# Patient Record
Sex: MALE | Race: WHITE
[De-identification: names, ages, dates, MRNs, and addresses within clinical notes are randomized per-mention and may not be internally consistent; named-entity substitution may affect disease eponyms.]

---

## 2018-10-23 ENCOUNTER — HOSPITAL ENCOUNTER (OUTPATIENT)
Dept: HOSPITAL 92 - LABBT | Age: 63
Discharge: HOME | End: 2018-10-23
Attending: SURGERY
Payer: COMMERCIAL

## 2018-10-23 DIAGNOSIS — K64.8: ICD-10-CM

## 2018-10-23 DIAGNOSIS — Z01.818: Primary | ICD-10-CM

## 2018-10-23 LAB
ANION GAP SERPL CALC-SCNC: 10 MMOL/L (ref 10–20)
BASOPHILS # BLD AUTO: 0.1 THOU/UL (ref 0–0.2)
BASOPHILS NFR BLD AUTO: 0.9 % (ref 0–1)
BUN SERPL-MCNC: 14 MG/DL (ref 8.4–25.7)
CALCIUM SERPL-MCNC: 9.1 MG/DL (ref 7.8–10.44)
CHLORIDE SERPL-SCNC: 105 MMOL/L (ref 98–107)
CO2 SERPL-SCNC: 24 MMOL/L (ref 23–31)
CREAT CL PREDICTED SERPL C-G-VRATE: 0 ML/MIN (ref 70–130)
EOSINOPHIL # BLD AUTO: 0.2 THOU/UL (ref 0–0.7)
EOSINOPHIL NFR BLD AUTO: 3.1 % (ref 0–10)
GLUCOSE SERPL-MCNC: 89 MG/DL (ref 80–115)
HGB BLD-MCNC: 13.8 G/DL (ref 14–18)
LYMPHOCYTES # BLD: 1.6 THOU/UL (ref 1.2–3.4)
LYMPHOCYTES NFR BLD AUTO: 23.7 % (ref 21–51)
MCH RBC QN AUTO: 30.5 PG (ref 27–31)
MCV RBC AUTO: 92.8 FL (ref 78–98)
MONOCYTES # BLD AUTO: 0.7 THOU/UL (ref 0.11–0.59)
MONOCYTES NFR BLD AUTO: 10.7 % (ref 0–10)
NEUTROPHILS # BLD AUTO: 4.1 THOU/UL (ref 1.4–6.5)
NEUTROPHILS NFR BLD AUTO: 61.6 % (ref 42–75)
PLATELET # BLD AUTO: 239 THOU/UL (ref 130–400)
POTASSIUM SERPL-SCNC: 4.4 MMOL/L (ref 3.5–5.1)
RBC # BLD AUTO: 4.53 MILL/UL (ref 4.7–6.1)
SODIUM SERPL-SCNC: 135 MMOL/L (ref 136–145)
WBC # BLD AUTO: 6.6 THOU/UL (ref 4.8–10.8)

## 2018-10-23 PROCEDURE — 80048 BASIC METABOLIC PNL TOTAL CA: CPT

## 2018-10-23 PROCEDURE — 93005 ELECTROCARDIOGRAM TRACING: CPT

## 2018-10-23 PROCEDURE — 93010 ELECTROCARDIOGRAM REPORT: CPT

## 2018-10-23 PROCEDURE — 85025 COMPLETE CBC W/AUTO DIFF WBC: CPT

## 2018-10-25 ENCOUNTER — HOSPITAL ENCOUNTER (OUTPATIENT)
Dept: HOSPITAL 92 - SDC | Age: 63
Discharge: HOME | End: 2018-10-25
Attending: SURGERY
Payer: COMMERCIAL

## 2018-10-25 VITALS — BODY MASS INDEX: 35.1 KG/M2

## 2018-10-25 DIAGNOSIS — Z79.899: ICD-10-CM

## 2018-10-25 DIAGNOSIS — Z79.82: ICD-10-CM

## 2018-10-25 DIAGNOSIS — K64.8: Primary | ICD-10-CM

## 2018-10-25 DIAGNOSIS — I10: ICD-10-CM

## 2018-10-25 DIAGNOSIS — K64.4: ICD-10-CM

## 2018-10-25 PROCEDURE — 88304 TISSUE EXAM BY PATHOLOGIST: CPT

## 2018-10-25 PROCEDURE — 96374 THER/PROPH/DIAG INJ IV PUSH: CPT

## 2018-10-25 PROCEDURE — 065Y0ZC DESTRUCTION OF HEMORRHOIDAL PLEXUS, OPEN APPROACH: ICD-10-PCS | Performed by: SURGERY

## 2018-10-25 NOTE — OP
DATE OF PROCEDURE:  10/25/2018

 

PREOPERATIVE DIAGNOSIS:  Prolapsing internal hemorrhoids.

 

POSTOPERATIVE DIAGNOSIS:  Prolapsing internal hemorrhoids.

 

PROCEDURE PERFORMED:  PPH stapled hemorrhoidectomy.

 

SURGEON:  Jack Alvarado M.D.

 

ANESTHESIA:  General.

 

ESTIMATED BLOOD LOSS:  Minimal.

 

COMPLICATIONS:  None.

 

SPECIMEN:  Hemorrhoid.

 

TECHNIQUE:  The patient was taken to the operating room and placed supine on the table.  After genera
l anesthetic was obtained, he was placed in prone position.  His buttock _____ taped open.  His _____
 area is prepped and draped in a sterile fashion.  Rectal exam reveals prolapsing internal hemorrhoid
s, but no mass.  The sphincter protecting device internal obturator was placed.  The sphincter protec
ting device is sewn in place using enclosed silk.  The 2-0 Prolene was used to make a pursestring 2 c
m above the dentate line.  Stapler was opened to its fullest extent.  The anvil was placed above the 
pursestring.  The sutures for the pursestring are pulled through the stapler under tension held on th
e sutures.  The stapler is tightened down to the green zone.  It is fired.  There is a good ring of s
taple line and a good ring of hemorrhoid tissue.  A few bleeders on the staple line were oversewn usi
ng Vicryl suture.  There is no active bleeding.  Gelfoam and lidocaine jelly packed in the anal canal
.  The sphincter protector was removed.  The patient is en route to recovery in stable condition.  Al
l instrument counts, needle counts, lap counts were correct.

## 2019-09-19 ENCOUNTER — HOSPITAL ENCOUNTER (INPATIENT)
Dept: HOSPITAL 92 - SJJU | Age: 64
LOS: 14 days | Discharge: HOME | DRG: 470 | End: 2019-10-03
Attending: ORTHOPAEDIC SURGERY | Admitting: ORTHOPAEDIC SURGERY
Payer: COMMERCIAL

## 2019-09-19 ENCOUNTER — HOSPITAL ENCOUNTER (OUTPATIENT)
Dept: HOSPITAL 92 - LABBT | Age: 64
Discharge: HOME | End: 2019-09-19
Attending: ORTHOPAEDIC SURGERY
Payer: COMMERCIAL

## 2019-09-19 VITALS — BODY MASS INDEX: 34 KG/M2

## 2019-09-19 DIAGNOSIS — I10: ICD-10-CM

## 2019-09-19 DIAGNOSIS — K59.00: ICD-10-CM

## 2019-09-19 DIAGNOSIS — G47.30: ICD-10-CM

## 2019-09-19 DIAGNOSIS — I25.10: ICD-10-CM

## 2019-09-19 DIAGNOSIS — Z79.51: ICD-10-CM

## 2019-09-19 DIAGNOSIS — K21.9: ICD-10-CM

## 2019-09-19 DIAGNOSIS — M17.11: ICD-10-CM

## 2019-09-19 DIAGNOSIS — Z79.01: ICD-10-CM

## 2019-09-19 DIAGNOSIS — M17.11: Primary | ICD-10-CM

## 2019-09-19 DIAGNOSIS — M10.9: ICD-10-CM

## 2019-09-19 DIAGNOSIS — E78.5: ICD-10-CM

## 2019-09-19 DIAGNOSIS — E78.00: ICD-10-CM

## 2019-09-19 DIAGNOSIS — Z01.818: Primary | ICD-10-CM

## 2019-09-19 DIAGNOSIS — Z95.5: ICD-10-CM

## 2019-09-19 DIAGNOSIS — Z79.899: ICD-10-CM

## 2019-09-19 LAB
ANION GAP SERPL CALC-SCNC: 16 MMOL/L (ref 10–20)
BASOPHILS # BLD AUTO: 0.1 THOU/UL (ref 0–0.2)
BASOPHILS NFR BLD AUTO: 1 % (ref 0–1)
BUN SERPL-MCNC: 17 MG/DL (ref 8.4–25.7)
CALCIUM SERPL-MCNC: 9.5 MG/DL (ref 7.8–10.44)
CHLORIDE SERPL-SCNC: 104 MMOL/L (ref 98–107)
CO2 SERPL-SCNC: 23 MMOL/L (ref 23–31)
CREAT CL PREDICTED SERPL C-G-VRATE: 0 ML/MIN (ref 70–130)
EOSINOPHIL # BLD AUTO: 0.3 THOU/UL (ref 0–0.7)
EOSINOPHIL NFR BLD AUTO: 3.3 % (ref 0–10)
GLUCOSE SERPL-MCNC: 94 MG/DL (ref 80–115)
HGB BLD-MCNC: 14.5 G/DL (ref 14–18)
INR PPP: 0.9
LYMPHOCYTES # BLD: 2.7 THOU/UL (ref 1.2–3.4)
LYMPHOCYTES NFR BLD AUTO: 30 % (ref 21–51)
MCH RBC QN AUTO: 30.5 PG (ref 27–31)
MCV RBC AUTO: 91.4 FL (ref 78–98)
MONOCYTES # BLD AUTO: 0.8 THOU/UL (ref 0.11–0.59)
MONOCYTES NFR BLD AUTO: 8.9 % (ref 0–10)
MUCOUS THREADS UR QL AUTO: (no result) LPF
NEUTROPHILS # BLD AUTO: 5.2 THOU/UL (ref 1.4–6.5)
NEUTROPHILS NFR BLD AUTO: 56.8 % (ref 42–75)
PLATELET # BLD AUTO: 228 THOU/UL (ref 130–400)
POTASSIUM SERPL-SCNC: 4.4 MMOL/L (ref 3.5–5.1)
PROT UR STRIP.AUTO-MCNC: 10 MG/DL
PROTHROMBIN TIME: 12.6 SEC (ref 12–14.7)
RBC # BLD AUTO: 4.75 MILL/UL (ref 4.7–6.1)
RBC UR QL AUTO: (no result) HPF (ref 0–3)
SODIUM SERPL-SCNC: 139 MMOL/L (ref 136–145)
WBC # BLD AUTO: 9.1 THOU/UL (ref 4.8–10.8)
WBC UR QL AUTO: (no result) HPF (ref 0–3)

## 2019-09-19 PROCEDURE — 85025 COMPLETE CBC W/AUTO DIFF WBC: CPT

## 2019-09-19 PROCEDURE — 86901 BLOOD TYPING SEROLOGIC RH(D): CPT

## 2019-09-19 PROCEDURE — 80048 BASIC METABOLIC PNL TOTAL CA: CPT

## 2019-09-19 PROCEDURE — C1713 ANCHOR/SCREW BN/BN,TIS/BN: HCPCS

## 2019-09-19 PROCEDURE — 86900 BLOOD TYPING SEROLOGIC ABO: CPT

## 2019-09-19 PROCEDURE — 93010 ELECTROCARDIOGRAM REPORT: CPT

## 2019-09-19 PROCEDURE — 36415 COLL VENOUS BLD VENIPUNCTURE: CPT

## 2019-09-19 PROCEDURE — 85027 COMPLETE CBC AUTOMATED: CPT

## 2019-09-19 PROCEDURE — 86850 RBC ANTIBODY SCREEN: CPT

## 2019-09-19 PROCEDURE — 81001 URINALYSIS AUTO W/SCOPE: CPT

## 2019-09-19 PROCEDURE — 85610 PROTHROMBIN TIME: CPT

## 2019-09-19 PROCEDURE — S0020 INJECTION, BUPIVICAINE HYDRO: HCPCS

## 2019-09-19 PROCEDURE — C1776 JOINT DEVICE (IMPLANTABLE): HCPCS

## 2019-09-19 PROCEDURE — 93005 ELECTROCARDIOGRAM TRACING: CPT

## 2019-09-19 PROCEDURE — 87081 CULTURE SCREEN ONLY: CPT

## 2019-09-30 LAB
INR PPP: 1
PROTHROMBIN TIME: 13.2 SEC (ref 12–14.7)

## 2019-09-30 PROCEDURE — 0SRC0J9 REPLACEMENT OF RIGHT KNEE JOINT WITH SYNTHETIC SUBSTITUTE, CEMENTED, OPEN APPROACH: ICD-10-PCS | Performed by: ORTHOPAEDIC SURGERY

## 2019-09-30 RX ADMIN — CEFAZOLIN SODIUM SCH MLS: 2 SOLUTION INTRAVENOUS at 14:39

## 2019-09-30 RX ADMIN — HYDROCODONE BITARTRATE AND ACETAMINOPHEN PRN TAB: 10; 325 TABLET ORAL at 14:37

## 2019-09-30 RX ADMIN — CEFAZOLIN SODIUM SCH MLS: 2 SOLUTION INTRAVENOUS at 23:16

## 2019-09-30 RX ADMIN — AZELASTINE HYDROCHLORIDE SCH SPR: 137 SPRAY, METERED NASAL at 21:12

## 2019-09-30 RX ADMIN — ASPIRIN SCH MG: 81 TABLET ORAL at 20:33

## 2019-09-30 RX ADMIN — DOCUSATE SODIUM 50 MG AND SENNOSIDES 8.6 MG SCH TAB: 8.6; 5 TABLET, FILM COATED ORAL at 20:34

## 2019-09-30 RX ADMIN — HYDROCODONE BITARTRATE AND ACETAMINOPHEN PRN TAB: 10; 325 TABLET ORAL at 21:17

## 2019-09-30 NOTE — OP
DATE OF PROCEDURE:  09/30/2019



ASSISTANT:  Brent Osei PA-C



PREOPERATIVE DIAGNOSES:  Right knee osteoarthrosis.



POSTOPERATIVE DIAGNOSES:  Right knee osteoarthrosis.



PROCEDURE PERFORMED:  Right total knee replacement using [place] pinless 
navigation.



ESTIMATED BLOOD LOSS:  150 mL.



TOURNIQUET TIME:  NONE.



COMPLICATIONS:  None.



IMPLANTS:  [place] [employer] total knee system, we used a size 6 cruciate-
retaining

femur.  We used a size 6 primary tibial baseplate.  We used a 6 x 9 mm CS X3 
tibial

bearing and an asymmetric 35 x 10 X3 patella. 



DISPOSITION:  He did go to recovery room in stable condition.



INDICATIONS:  This is a 64-year-old male, who comes in complaining of 
significant

right knee pain.  He has failed nonoperative treatment.  The patient has a 
previous

history of dysvascular leg and has had multiple stents in the right leg. 



PROCEDURE IN DETAIL:  After all appropriate consent forms were explained and 
signed,

the patient was taken back to the operating room and at this time was given 
general

anesthetic.  Once the level of anesthesia was appropriate, the right leg was 
then prepped and draped in standard surgical fashion.   Midline incision was 
made with a 10 blade down through the skin and subcutaneous tissue. 

Bovie electrocautery was used to coagulate any brisk venous bleeding.  A new 
blade was used to make a medial

parapatellar arthrotomy.  Small subperiosteal release was performed medially and

excess fat pad was removed.  The knee was flexed up to gain access to the femur.

The femur was navigated and distal femoral resection was made.  Epicondylar 
access

was used to align our sizing jig and this was pinned in place.  We sized our 
femur

to be a size 6 cruciate-retaining femur.  4:1 cutting block was applied and 
pinned.

Anterior and posterior chamfer cuts were then made.  We navigated out our 
proximal

tibia and made our proximal tibial resection.  Spreaders were used to remove any

posterior osteophytes off the back of the femur as well as remaining meniscal

tissue.  A long alignment mary jane was then used to achieve correct rotation of our

tibial baseplate and we used a size 6 primary tibial baseplate was chosen.  
This was

pinned in place.  We trialed the polyethylene and we used a 6 x 9 mm CS X3 
tibial

bearing polyethylene gave us full extension and good stability throughout range 
of

motion.  Two towel clips and a saw were used to cut our patella.  Three lug nuts

were drilled and an asymmetric 35 x 10 X3 patella was trialed which sat nicely 
in

the trochlear groove.  We then drilled our femur and punched our tibia.  All

components were removed.  The knee was thoroughly irrigated and dried.  Cement 
was

mixed into the cement gun on the back table.  Components were then placed.  The 
knee

was held out in full extension until the cement had dried.  All excess bone 
cement

was removed.  Multiple #2 Vicryl stitches as well as a Quill were used to close 
our

extensor mechanism.  0 Quill followed by a running Monoderm was then used to 
close

the skin.  Surgicel glue was then used on the skin.  Once this had dried, soft

tissue dressing was applied to the limb,  The patient was then awakened and 
taken to the recovery room in

stable condition.  All counts were correct at the end of the case.  The patient 
did

receive preoperative IV antibiotics.  The patient was injected with Marcaine for

postoperative pain relief. 







Job ID:  [id number]



[facility]

## 2019-10-01 LAB
HGB BLD-MCNC: 11.1 G/DL (ref 14–18)
MCH RBC QN AUTO: 31.7 PG (ref 27–31)
MCV RBC AUTO: 92 FL (ref 78–98)
PLATELET # BLD AUTO: 172 THOU/UL (ref 130–400)
RBC # BLD AUTO: 3.51 MILL/UL (ref 4.7–6.1)
WBC # BLD AUTO: 8.7 THOU/UL (ref 4.8–10.8)

## 2019-10-01 RX ADMIN — DOCUSATE SODIUM 50 MG AND SENNOSIDES 8.6 MG SCH TAB: 8.6; 5 TABLET, FILM COATED ORAL at 08:00

## 2019-10-01 RX ADMIN — ASPIRIN SCH MG: 81 TABLET ORAL at 08:01

## 2019-10-01 RX ADMIN — ASPIRIN SCH MG: 81 TABLET ORAL at 21:00

## 2019-10-01 RX ADMIN — MULTIPLE VITAMINS W/ MINERALS TAB SCH TAB: TAB at 08:00

## 2019-10-01 RX ADMIN — AZELASTINE HYDROCHLORIDE SCH SPR: 137 SPRAY, METERED NASAL at 08:10

## 2019-10-01 RX ADMIN — HYDROCODONE BITARTRATE AND ACETAMINOPHEN PRN TAB: 10; 325 TABLET ORAL at 06:36

## 2019-10-01 RX ADMIN — AZELASTINE HYDROCHLORIDE SCH SPR: 137 SPRAY, METERED NASAL at 21:06

## 2019-10-01 RX ADMIN — HYDROCODONE BITARTRATE AND ACETAMINOPHEN PRN TAB: 10; 325 TABLET ORAL at 02:10

## 2019-10-01 RX ADMIN — HYDROCODONE BITARTRATE AND ACETAMINOPHEN PRN TAB: 10; 325 TABLET ORAL at 14:40

## 2019-10-01 RX ADMIN — HYDROCODONE BITARTRATE AND ACETAMINOPHEN PRN TAB: 10; 325 TABLET ORAL at 10:23

## 2019-10-01 RX ADMIN — HYDROCODONE BITARTRATE AND ACETAMINOPHEN PRN TAB: 10; 325 TABLET ORAL at 18:33

## 2019-10-01 RX ADMIN — DOCUSATE SODIUM 50 MG AND SENNOSIDES 8.6 MG SCH TAB: 8.6; 5 TABLET, FILM COATED ORAL at 21:01

## 2019-10-01 RX ADMIN — HYDROCODONE BITARTRATE AND ACETAMINOPHEN PRN TAB: 10; 325 TABLET ORAL at 22:31

## 2019-10-01 NOTE — PDOC.HOSPP
- Subjective


Encounter Date: 10/01/19


Encounter Time: 15:00


Subjective: 





pt up in chair no complains





- Objective


Vital Signs & Weight: 


 Vital Signs (12 hours)











  Temp Pulse Resp BP BP Pulse Ox


 


 10/01/19 15:20  98.5 F  69  18   152/73 H  97


 


 10/01/19 08:01   72   134/71  


 


 10/01/19 07:53  98.2 F  72  18   134/71  98








 Weight











Admit Weight                   258 lb


 


Weight                         258 lb














I&O: 


 











 09/30/19 10/01/19 10/02/19





 06:59 06:59 06:59


 


Intake Total  1550 


 


Balance  1550 











Result Diagrams: 


 10/01/19 03:53








Hospitalist ROS





- Review of Systems


Respiratory: denies: cough, dry, shortness of breath, hemoptysis, SOB with 

excertion, pleuritic pain, sputum, wheezing, other


Cardiovascular: denies: chest pain, palpitations, orthopnea, paroxysmal noc. 

dyspnea, edema, light headedness, other


Gastrointestinal: denies: nausea, vomiting, abdominal pain, diarrhea, 

constipation, melena, hematochezia, other





- Medication


Medications: 


Active Medications











Generic Name Dose Route Start Last Admin





  Trade Name Freq  PRN Reason Stop Dose Admin


 


Hydrocodone Bitart/Acetaminophen  2 tab  09/30/19 08:22  10/01/19 18:33





  Norco 10/325  PO   2 tab





  Q4H PRN   Administration





  PAIN (4-6)   





     





     





     


 


Allopurinol  300 mg  10/01/19 09:00  10/01/19 08:00





  Zyloprim  PO   300 mg





  DAILY DEEPIKA   Administration





     





     





     





     


 


Amlodipine Besylate  2.5 mg  10/01/19 09:00  10/01/19 08:01





  Norvasc  PO   2.5 mg





  DAILY DEEPIKA   Administration





     





     





     





     


 


Aspirin  81 mg  09/30/19 21:00  10/01/19 08:01





  Ecotrin  PO   81 mg





  BID DEEPIKA   Administration





     





     





     





     


 


Atorvastatin Calcium  80 mg  09/30/19 21:00  09/30/19 20:33





  Lipitor  PO   80 mg





  QPM DEEPIKA   Administration





     





     





     





     


 


Azelastine HCl  0 ml  09/30/19 21:00  10/01/19 08:10





  Azelastine  FS   2 spr





  BID DEEPIKA   Administration





     





     





     





     


 


Cholecalciferol  1,000 units  10/01/19 09:00  10/01/19 08:02





  Vitamin D3  PO   1,000 units





  DAILY DEEPIKA   Administration





     





     





     





     


 


Clopidogrel Bisulfate  75 mg  10/01/19 09:00  10/01/19 08:01





  Plavix  PO   75 mg





  DAILY DEEPIKA   Administration





     





     





     





     


 


Ezetimibe  10 mg  09/30/19 21:00  09/30/19 20:33





  Zetia  PO   10 mg





  HS DEEPIKA   Administration





     





     





     





     


 


Fentanyl  50 mcg  09/30/19 08:23  10/01/19 09:18





  Sublimaze  SLOW IVP   50 mcg





  Q1H PRN   Administration





  Breakthrough Pain   





     





     





     


 


Ferrous Gluconate  324 mg  09/30/19 21:00  10/01/19 08:00





  Fergon  PO   324 mg





  BID DEEPIKA   Administration





     





     





     





     


 


Fish Oil  1,000 mg  10/01/19 09:00  10/01/19 08:00





  Fish Oil  PO   1,000 mg





  DAILY DEEPIKA   Administration





     





     





     





     


 


Fluticasone Propionate  0 gm  10/01/19 09:00  10/01/19 10:24





  Flonase Nasal [place]  NASAL   2 spray





  DAILY DEEPIKA   Administration





     





     





     





     


 


Ropivacaine 250 ml/ Device  250 mls @ 10 mls/hr  09/30/19 08:22  10/01/19 12:36





  NERVE BLCK   250 mls





  INF DEEPIKA   Administration





     





     





     





  As Directed   


 


Sodium Chloride  1,000 mls @ 100 mls/hr  09/30/19 10:00  10/01/19 17:03





  Normal Saline 0.9%  IV   Not Given





  .Q10H DEEPIKA   





     





     





     





     


 


Iron/Minerals/Multivitamins  1 tab  10/01/19 09:00  10/01/19 08:00





  Theragran M  PO   1 tab





  DAILY DEEPIKA   Administration





     





     





     





     


 


Ketorolac Tromethamine  30 mg  09/30/19 12:00  10/01/19 17:39





  Toradol  IVP  10/02/19 06:01  30 mg





  Q6HR DEEPIKA   Administration





     





     





     





     


 


Loratadine  10 mg  09/30/19 21:00  09/30/19 20:34





  Claritin  PO   10 mg





  QPM DEEPIKA   Administration





     





     





     





     


 


Losartan Potassium  100 mg  10/01/19 09:00  10/01/19 08:02





  Cozaar  PO   100 mg





  DAILY DEEPIKA   Administration





     





     





     





     


 


Metoprolol Tartrate  50 mg  10/01/19 09:00  10/01/19 08:00





  Lopressor  PO   50 mg





  DAILY DEEPIKA   Administration





     





     





     





     


 


Pantoprazole Sodium  40 mg  10/01/19 09:00  10/01/19 08:00





  Protonix  PO   40 mg





  DAILY DEEPIKA   Administration





     





     





     





     


 


Polyethylene Glycol  17 gm  10/01/19 09:00  10/01/19 07:59





  Miralax  PO   17 gm





  DAILY DEEPIKA   Administration





     





     





     





     


 


Senna/Docusate Sodium  2 tab  09/30/19 21:00  10/01/19 08:00





  Senokot S  PO   2 tab





  BID DEEPIKA   Administration





     





     





     





     


 


Sodium Chloride  10 ml  09/30/19 09:46  10/01/19 08:00





  Flush - Normal Saline  IVF   10 ml





  PRN PRN   Administration





  Saline Flush   





     





     





     


 


Tramadol HCl  100 mg  09/30/19 08:22  10/01/19 12:42





  Ultram  PO   100 mg





  Q6H PRN   Administration





  Moderate Pain 4-6   





     





     





     


 


Vitamin E  1,000 units  10/01/19 09:00  10/01/19 10:24





  Vitamin E  PO   1,000 units





  DAILY DEEPIKA   Administration





     





     





     





     














- Exam


Heart: negative: RRR, no murmur, no gallops, no rubs, normal peripheral pulses, 

irregular, diminshed peripheral pulses, murmur present, II/IV, III/IV


Respiratory: negative: CTAB, no wheezes, no rales, no ronchi, normal chest 

expansion, no tachypnea, normal percussion, rales, rhonchi, tachypneic, wheezes


Gastrointestinal: negative: soft, non-tender, non-distended, normal bowel sounds

, no palpable masses, no hepatomegaly, no splenomegaly, no bruit, no guarding, 

no rigidity, tender to palpation, distended, diminished bowl sounds, voluntary 

guarding





Hosp A/P


(1) CAD (coronary artery disease)


Code(s): I25.10 - ATHSCL HEART DISEASE OF NATIVE CORONARY ARTERY W/O ANG PCTRS 

  Status: Acute   





(2) HTN (hypertension)


Code(s): I10 - ESSENTIAL (PRIMARY) HYPERTENSION   Status: Acute   





(3) Hyperlipemia


Code(s): E78.5 - HYPERLIPIDEMIA, UNSPECIFIED   Status: Acute   





- Plan





pt up in chair no complains, will continue his bp meds, pt has CAD s/o 10 

stents. will continue asa/plavix/statin.

## 2019-10-01 NOTE — PRG
DATE OF SERVICE:  10/01/2019



SUBJECTIVE:  José Manuel is a 64-year-old male who is postop day 1 from a right total

knee arthroplasty.  He is doing relatively well.  His pain is very well controlled

with block and his oral medications.  He has no complaints currently.  José Manuel

walked 300 feet this morning. 



OBJECTIVE:  VITAL SIGNS:  Temperature 98.2, pulse 72, respiratory rate 18, blood

pressure is 134/71. 

GENERAL:  He is alert and oriented to person, place, time, and situation.

Responsive to appropriate with examiner.  Grossly nonfocal. 

EXTREMITIES:  His incision is clean.  There is no strike through.  No erythema. 

NEUROLOGIC:  He is neurovascularly intact.



LABORATORY DATA:  Hemoglobin and hematocrit 11.1 and 32.2.



IMPRESSION:  A 64-year-old male postop day 1, right total knee arthroplasty doing

exceptionally well. 



PLAN:  Continue current care.  Probable discharge home tomorrow.







Job ID:  [id number]

## 2019-10-02 LAB
HGB BLD-MCNC: 10.5 G/DL (ref 14–18)
MCH RBC QN AUTO: 31.1 PG (ref 27–31)
MCV RBC AUTO: 92.6 FL (ref 78–98)
PLATELET # BLD AUTO: 171 THOU/UL (ref 130–400)
RBC # BLD AUTO: 3.38 MILL/UL (ref 4.7–6.1)
WBC # BLD AUTO: 8.7 THOU/UL (ref 4.8–10.8)

## 2019-10-02 RX ADMIN — HYDROCODONE BITARTRATE AND ACETAMINOPHEN PRN TAB: 10; 325 TABLET ORAL at 12:42

## 2019-10-02 RX ADMIN — DOCUSATE SODIUM 50 MG AND SENNOSIDES 8.6 MG SCH: 8.6; 5 TABLET, FILM COATED ORAL at 09:15

## 2019-10-02 RX ADMIN — AZELASTINE HYDROCHLORIDE SCH SPR: 137 SPRAY, METERED NASAL at 21:12

## 2019-10-02 RX ADMIN — ASPIRIN SCH MG: 81 TABLET ORAL at 21:03

## 2019-10-02 RX ADMIN — DOCUSATE SODIUM 50 MG AND SENNOSIDES 8.6 MG SCH TAB: 8.6; 5 TABLET, FILM COATED ORAL at 21:03

## 2019-10-02 RX ADMIN — AZELASTINE HYDROCHLORIDE SCH SPR: 137 SPRAY, METERED NASAL at 09:14

## 2019-10-02 RX ADMIN — HYDROCODONE BITARTRATE AND ACETAMINOPHEN PRN TAB: 10; 325 TABLET ORAL at 06:23

## 2019-10-02 RX ADMIN — MULTIPLE VITAMINS W/ MINERALS TAB SCH TAB: TAB at 09:12

## 2019-10-02 RX ADMIN — ASPIRIN SCH MG: 81 TABLET ORAL at 09:12

## 2019-10-03 VITALS — TEMPERATURE: 98.2 F | SYSTOLIC BLOOD PRESSURE: 148 MMHG | DIASTOLIC BLOOD PRESSURE: 80 MMHG

## 2019-10-03 LAB
HGB BLD-MCNC: 10.7 G/DL (ref 14–18)
MCH RBC QN AUTO: 31.5 PG (ref 27–31)
MCV RBC AUTO: 89.6 FL (ref 78–98)
PLATELET # BLD AUTO: 178 THOU/UL (ref 130–400)
RBC # BLD AUTO: 3.39 MILL/UL (ref 4.7–6.1)
WBC # BLD AUTO: 8.2 THOU/UL (ref 4.8–10.8)

## 2019-10-03 RX ADMIN — HYDROCODONE BITARTRATE AND ACETAMINOPHEN PRN TAB: 10; 325 TABLET ORAL at 03:01

## 2019-10-03 RX ADMIN — ASPIRIN SCH MG: 81 TABLET ORAL at 08:42

## 2019-10-03 RX ADMIN — HYDROCODONE BITARTRATE AND ACETAMINOPHEN PRN TAB: 10; 325 TABLET ORAL at 08:46

## 2019-10-03 RX ADMIN — AZELASTINE HYDROCHLORIDE SCH SPR: 137 SPRAY, METERED NASAL at 08:51

## 2019-10-03 RX ADMIN — DOCUSATE SODIUM 50 MG AND SENNOSIDES 8.6 MG SCH TAB: 8.6; 5 TABLET, FILM COATED ORAL at 08:46

## 2019-10-03 RX ADMIN — MULTIPLE VITAMINS W/ MINERALS TAB SCH TAB: TAB at 08:44

## 2019-11-22 ENCOUNTER — HOSPITAL ENCOUNTER (OUTPATIENT)
Dept: HOSPITAL 92 - LABBT | Age: 64
Discharge: HOME | End: 2019-11-22
Attending: ORTHOPAEDIC SURGERY
Payer: COMMERCIAL

## 2019-11-22 DIAGNOSIS — S76.111A: ICD-10-CM

## 2019-11-22 DIAGNOSIS — Z01.818: Primary | ICD-10-CM

## 2019-11-22 LAB
ANION GAP SERPL CALC-SCNC: 8 MMOL/L (ref 10–20)
BASOPHILS # BLD AUTO: 0.1 THOU/UL (ref 0–0.2)
BASOPHILS NFR BLD AUTO: 1 % (ref 0–1)
BUN SERPL-MCNC: 14 MG/DL (ref 8.4–25.7)
CALCIUM SERPL-MCNC: 9.3 MG/DL (ref 7.8–10.44)
CHLORIDE SERPL-SCNC: 103 MMOL/L (ref 98–107)
CO2 SERPL-SCNC: 30 MMOL/L (ref 23–31)
CREAT CL PREDICTED SERPL C-G-VRATE: 0 ML/MIN (ref 70–130)
EOSINOPHIL # BLD AUTO: 0.3 THOU/UL (ref 0–0.7)
EOSINOPHIL NFR BLD AUTO: 3.7 % (ref 0–10)
GLUCOSE SERPL-MCNC: 90 MG/DL (ref 80–115)
HGB BLD-MCNC: 13 G/DL (ref 14–18)
INR PPP: 1
LYMPHOCYTES # BLD: 2.3 THOU/UL (ref 1.2–3.4)
LYMPHOCYTES NFR BLD AUTO: 26.8 % (ref 21–51)
MCH RBC QN AUTO: 30.2 PG (ref 27–31)
MCV RBC AUTO: 93.7 FL (ref 78–98)
MONOCYTES # BLD AUTO: 0.7 THOU/UL (ref 0.11–0.59)
MONOCYTES NFR BLD AUTO: 8.3 % (ref 0–10)
NEUTROPHILS # BLD AUTO: 5.2 THOU/UL (ref 1.4–6.5)
NEUTROPHILS NFR BLD AUTO: 60.2 % (ref 42–75)
PLATELET # BLD AUTO: 254 THOU/UL (ref 130–400)
POTASSIUM SERPL-SCNC: 4.9 MMOL/L (ref 3.5–5.1)
PROTHROMBIN TIME: 12.8 SEC (ref 12–14.7)
RBC # BLD AUTO: 4.31 MILL/UL (ref 4.7–6.1)
SODIUM SERPL-SCNC: 136 MMOL/L (ref 136–145)
WBC # BLD AUTO: 8.7 THOU/UL (ref 4.8–10.8)

## 2019-11-22 PROCEDURE — 80048 BASIC METABOLIC PNL TOTAL CA: CPT

## 2019-11-22 PROCEDURE — 85610 PROTHROMBIN TIME: CPT

## 2019-11-22 PROCEDURE — 85025 COMPLETE CBC W/AUTO DIFF WBC: CPT

## 2019-11-22 PROCEDURE — 93010 ELECTROCARDIOGRAM REPORT: CPT

## 2019-11-22 PROCEDURE — 93005 ELECTROCARDIOGRAM TRACING: CPT

## 2019-11-24 NOTE — EKG
Test Reason : 

Blood Pressure : ***/*** mmHG

Vent. Rate : 062 BPM     Atrial Rate : 062 BPM

   P-R Int : 238 ms          QRS Dur : 082 ms

    QT Int : 404 ms       P-R-T Axes : 015 021 015 degrees

   QTc Int : 410 ms

 

Sinus rhythm with 1st degree A-V block

Inferior infarct , age undetermined

Abnormal ECG

When compared with ECG of 19-SEP-2019 12:23,

Inferior infarct is now Present

Confirmed by SONG CARPENTER (2) on 11/24/2019 2:30:15 PM

 

Referred By:  RENA           Confirmed By:SONG CARPENTER

## 2019-11-25 ENCOUNTER — HOSPITAL ENCOUNTER (OUTPATIENT)
Dept: HOSPITAL 92 - SDC | Age: 64
Discharge: HOME | End: 2019-11-25
Attending: ORTHOPAEDIC SURGERY
Payer: COMMERCIAL

## 2019-11-25 VITALS — BODY MASS INDEX: 34.5 KG/M2

## 2019-11-25 DIAGNOSIS — Z96.651: ICD-10-CM

## 2019-11-25 DIAGNOSIS — S76.111A: Primary | ICD-10-CM

## 2019-11-25 DIAGNOSIS — Z95.5: ICD-10-CM

## 2019-11-25 DIAGNOSIS — I10: ICD-10-CM

## 2019-11-25 DIAGNOSIS — Z79.899: ICD-10-CM

## 2019-11-25 DIAGNOSIS — F10.21: ICD-10-CM

## 2019-11-25 LAB
INR PPP: 0.9
PROTHROMBIN TIME: 12.2 SEC (ref 12–14.7)

## 2019-11-25 PROCEDURE — 0LQL0ZZ REPAIR RIGHT UPPER LEG TENDON, OPEN APPROACH: ICD-10-PCS | Performed by: ORTHOPAEDIC SURGERY

## 2019-11-25 PROCEDURE — 85610 PROTHROMBIN TIME: CPT

## 2019-11-25 PROCEDURE — L1830 KO IMMOB CANVAS LONG PRE OTS: HCPCS

## 2019-11-25 PROCEDURE — S0020 INJECTION, BUPIVICAINE HYDRO: HCPCS

## 2019-11-25 NOTE — OP
DATE OF PROCEDURE:  11/25/2019



PREOPERATIVE DIAGNOSIS:  Quadriceps tendon tear, status post right total knee

replacement. 



POSTOPERATIVE DIAGNOSIS:  Quadriceps tendon tear, status post right total

knee replacement. 



PROCEDURE PERFORMED:  Open repair of right quad tendon rupture.



ASSISTANT:  Brent Osei PA-C



ESTIMATED BLOOD LOSS:  20 to 30 mL.



COMPLICATIONS:  None.



ANESTHESIA:  He had a general anesthetic.  We did give him some local for postop

pain relief. 



IMPLANTS:  The only implants were some sutures.



INDICATIONS:  A 64-year-old male, who had a knee replacement done around 2 
months

ago and at this time, he was found last week to have an area of his quad tendon

repair that had opened up and he was having difficulty straightening his leg and

also noted a bulge underneath the skin.  At this time, he is taken for repair of

this. 



DESCRIPTION OF PROCEDURE:  After all appropriate consent forms were explained 
and

signed, he was taken back to the operative room and at this time was given a 
general

anesthetic.  Once the level of anesthesia was appropriate, the right lower 
extremity

was prepped and draped in standard surgical fashion.  No tourniquet was used

throughout this procedure.  At this time, a 10 blade was used to incise through 
his

old incision down through skin.  Bovie was used to coagulate any brisk venous

bleeding.  Joint fluid was evacuated at this time since we got underneath the 
skin.

We then made our way down to the quad tendon repair area and there was a small 
area

approximately 1.5 to 2 cm in length, which had opened up.  Everything else 
appeared

intact.  There was some granulation tissue in the area and this was removed 
with a

curette and a rongeur.  We then thoroughly irrigated with a liter of solution.  
At

this time, multiple #2 Vicryl were used to close repair primarily, followed by 
a #2

Stratafix.  We then used a 2-0 Vicryl and nylon sutures to close the remaining 
area.

 Prior to closing the skin, we did infiltrate medial and lateral skin flaps with

plain Marcaine for postop pain relief.  A bulky sterile dressing was then 
applied as

well as a knee immobilizer.  The patient was then awakened.  He was taken to the

recovery room in stable condition.  All counts were correct at the end of the 
case

and he did receive preoperative IV antibiotics. 







Job ID:  [id number]



[facility]

## 2020-01-27 ENCOUNTER — HOSPITAL ENCOUNTER (OUTPATIENT)
Dept: HOSPITAL 92 - SCSRAD | Age: 65
Discharge: HOME | End: 2020-01-27
Attending: FAMILY MEDICINE
Payer: COMMERCIAL

## 2020-01-27 DIAGNOSIS — S99.921A: Primary | ICD-10-CM

## 2020-01-27 NOTE — RAD
EXAM: 3 views of the right foot



HISTORY: Foot pain



COMPARISON: None



FINDINGS: 3 views of the right foot shows no evidence of acute fracture or dislocation. No soft tissu
e swelling is seen. No degenerative changes are present.



IMPRESSION: No evidence of acute osseous abnormality.



Reported By: Guy Esparza 

Electronically Signed:  1/27/2020 9:30 AM

## 2021-10-18 ENCOUNTER — HOSPITAL ENCOUNTER (OUTPATIENT)
Dept: HOSPITAL 92 - TBSIIMAG | Age: 66
Discharge: HOME | End: 2021-10-18
Attending: ORTHOPAEDIC SURGERY
Payer: MEDICARE

## 2021-10-18 DIAGNOSIS — M76.9: ICD-10-CM

## 2021-10-18 DIAGNOSIS — S73.101A: ICD-10-CM

## 2021-10-18 DIAGNOSIS — M25.551: Primary | ICD-10-CM

## 2022-05-24 ENCOUNTER — HOSPITAL ENCOUNTER (OUTPATIENT)
Dept: HOSPITAL 92 - CSHLAB | Age: 67
Discharge: HOME | End: 2022-05-24
Attending: SURGERY
Payer: MEDICARE

## 2022-05-24 DIAGNOSIS — Z20.822: ICD-10-CM

## 2022-05-24 DIAGNOSIS — Z01.818: Primary | ICD-10-CM

## 2022-05-24 LAB
ANION GAP SERPL CALC-SCNC: 15 MMOL/L (ref 10–20)
BUN SERPL-MCNC: 11 MG/DL (ref 8.4–25.7)
CALCIUM SERPL-MCNC: 9.4 MG/DL (ref 7.8–10.44)
CHLORIDE SERPL-SCNC: 102 MMOL/L (ref 98–107)
CO2 SERPL-SCNC: 23 MMOL/L (ref 23–31)
CREAT CL PREDICTED SERPL C-G-VRATE: 0 ML/MIN (ref 70–130)
GLUCOSE SERPL-MCNC: 112 MG/DL (ref 80–115)
HGB BLD-MCNC: 14.1 G/DL (ref 13.5–17.5)
MCH RBC QN AUTO: 31.1 PG (ref 27–33)
MCV RBC AUTO: 92.7 FL (ref 81.2–95.1)
PLATELET # BLD AUTO: 213 10X3/UL (ref 150–450)
POTASSIUM SERPL-SCNC: 4.4 MMOL/L (ref 3.5–5.1)
RBC # BLD AUTO: 4.53 10X6/UL (ref 4.32–5.72)
SODIUM SERPL-SCNC: 136 MMOL/L (ref 136–145)
WBC # BLD AUTO: 8.1 10X3/UL (ref 3.5–10.5)

## 2022-05-24 PROCEDURE — 80048 BASIC METABOLIC PNL TOTAL CA: CPT

## 2022-05-24 PROCEDURE — 93010 ELECTROCARDIOGRAM REPORT: CPT

## 2022-05-24 PROCEDURE — U0003 INFECTIOUS AGENT DETECTION BY NUCLEIC ACID (DNA OR RNA); SEVERE ACUTE RESPIRATORY SYNDROME CORONAVIRUS 2 (SARS-COV-2) (CORONAVIRUS DISEASE [COVID-19]), AMPLIFIED PROBE TECHNIQUE, MAKING USE OF HIGH THROUGHPUT TECHNOLOGIES AS DESCRIBED BY CMS-2020-01-R: HCPCS

## 2022-05-24 PROCEDURE — 93005 ELECTROCARDIOGRAM TRACING: CPT

## 2022-05-24 PROCEDURE — 85027 COMPLETE CBC AUTOMATED: CPT

## 2022-05-24 PROCEDURE — U0005 INFEC AGEN DETEC AMPLI PROBE: HCPCS

## 2022-05-26 ENCOUNTER — HOSPITAL ENCOUNTER (OUTPATIENT)
Dept: HOSPITAL 92 - CSHSDC | Age: 67
Discharge: HOME | End: 2022-05-26
Attending: SURGERY
Payer: MEDICARE

## 2022-05-26 VITALS — BODY MASS INDEX: 31.1 KG/M2

## 2022-05-26 DIAGNOSIS — Z85.850: ICD-10-CM

## 2022-05-26 DIAGNOSIS — E78.5: ICD-10-CM

## 2022-05-26 DIAGNOSIS — I25.10: ICD-10-CM

## 2022-05-26 DIAGNOSIS — I50.9: ICD-10-CM

## 2022-05-26 DIAGNOSIS — Z79.02: ICD-10-CM

## 2022-05-26 DIAGNOSIS — Z79.899: ICD-10-CM

## 2022-05-26 DIAGNOSIS — C25.9: Primary | ICD-10-CM

## 2022-05-26 DIAGNOSIS — I11.0: ICD-10-CM

## 2022-05-26 DIAGNOSIS — G47.33: ICD-10-CM

## 2022-05-26 DIAGNOSIS — Z79.891: ICD-10-CM

## 2022-05-26 DIAGNOSIS — Z79.82: ICD-10-CM

## 2022-05-26 PROCEDURE — 36561 INSERT TUNNELED CV CATH: CPT

## 2022-05-26 PROCEDURE — C1788 PORT, INDWELLING, IMP: HCPCS

## 2022-11-30 ENCOUNTER — HOSPITAL ENCOUNTER (OUTPATIENT)
Dept: HOSPITAL 92 - BICCT | Age: 67
Discharge: HOME | End: 2022-11-30
Attending: STUDENT IN AN ORGANIZED HEALTH CARE EDUCATION/TRAINING PROGRAM
Payer: MEDICARE

## 2022-11-30 DIAGNOSIS — K65.4: ICD-10-CM

## 2022-11-30 DIAGNOSIS — R91.8: ICD-10-CM

## 2022-11-30 DIAGNOSIS — Z98.890: ICD-10-CM

## 2022-11-30 DIAGNOSIS — C25.9: Primary | ICD-10-CM

## 2022-11-30 PROCEDURE — 74177 CT ABD & PELVIS W/CONTRAST: CPT

## 2022-11-30 PROCEDURE — 71260 CT THORAX DX C+: CPT

## 2022-12-24 ENCOUNTER — HOSPITAL ENCOUNTER (EMERGENCY)
Dept: HOSPITAL 92 - ERS | Age: 67
Discharge: ANOTHER HOSPITAL | End: 2022-12-24
Payer: MEDICARE

## 2022-12-24 DIAGNOSIS — Z79.82: ICD-10-CM

## 2022-12-24 DIAGNOSIS — Z79.899: ICD-10-CM

## 2022-12-24 DIAGNOSIS — Z20.822: ICD-10-CM

## 2022-12-24 DIAGNOSIS — I10: ICD-10-CM

## 2022-12-24 DIAGNOSIS — R50.9: Primary | ICD-10-CM

## 2022-12-24 LAB
ALBUMIN SERPL BCG-MCNC: 3.1 G/DL (ref 3.4–4.8)
ALP SERPL-CCNC: 90 U/L (ref 40–110)
ALT SERPL W P-5'-P-CCNC: 15 U/L (ref 8–55)
ANION GAP SERPL CALC-SCNC: 14 MMOL/L (ref 10–20)
AST SERPL-CCNC: 19 U/L (ref 5–34)
BASOPHILS # BLD AUTO: 0 THOU/UL (ref 0–0.2)
BASOPHILS NFR BLD AUTO: 0 % (ref 0–1)
BILIRUB SERPL-MCNC: 0.7 MG/DL (ref 0.2–1.2)
BUN SERPL-MCNC: 14 MG/DL (ref 8.4–25.7)
CALCIUM SERPL-MCNC: 8.9 MG/DL (ref 7.8–10.44)
CHLORIDE SERPL-SCNC: 95 MMOL/L (ref 98–107)
CO2 SERPL-SCNC: 23 MMOL/L (ref 23–31)
CREAT CL PREDICTED SERPL C-G-VRATE: 0 ML/MIN (ref 70–130)
EOSINOPHIL # BLD AUTO: 0.1 THOU/UL (ref 0–0.7)
EOSINOPHIL NFR BLD AUTO: 0.3 % (ref 0–10)
GLOBULIN SER CALC-MCNC: 3 G/DL (ref 2.4–3.5)
GLUCOSE SERPL-MCNC: 153 MG/DL (ref 80–115)
HGB BLD-MCNC: 8.7 G/DL (ref 14–18)
LIPASE SERPL-CCNC: 11 U/L (ref 8–78)
LYMPHOCYTES # BLD: 1.3 THOU/UL (ref 1.2–3.4)
LYMPHOCYTES NFR BLD AUTO: 6 % (ref 21–51)
MCH RBC QN AUTO: 26.7 PG (ref 27–31)
MCV RBC AUTO: 83.1 FL (ref 78–98)
MONOCYTES # BLD AUTO: 1.8 THOU/UL (ref 0.11–0.59)
MONOCYTES NFR BLD AUTO: 8.2 % (ref 0–10)
NEUTROPHILS # BLD AUTO: 18.4 THOU/UL (ref 1.4–6.5)
NEUTROPHILS NFR BLD AUTO: 85.5 % (ref 42–75)
PLATELET # BLD AUTO: 498 10X3/UL (ref 130–400)
POTASSIUM SERPL-SCNC: 3.8 MMOL/L (ref 3.5–5.1)
PROT UR STRIP.AUTO-MCNC: 20 MG/DL
RBC # BLD AUTO: 3.25 MILL/UL (ref 4.7–6.1)
SODIUM SERPL-SCNC: 128 MMOL/L (ref 136–145)
SP GR UR STRIP: 1.03 (ref 1–1.04)
WBC # BLD AUTO: 21.5 10X3/UL (ref 4.8–10.8)

## 2022-12-24 PROCEDURE — 83690 ASSAY OF LIPASE: CPT

## 2022-12-24 PROCEDURE — 87040 BLOOD CULTURE FOR BACTERIA: CPT

## 2022-12-24 PROCEDURE — 96366 THER/PROPH/DIAG IV INF ADDON: CPT

## 2022-12-24 PROCEDURE — 80053 COMPREHEN METABOLIC PANEL: CPT

## 2022-12-24 PROCEDURE — 83605 ASSAY OF LACTIC ACID: CPT

## 2022-12-24 PROCEDURE — 86140 C-REACTIVE PROTEIN: CPT

## 2022-12-24 PROCEDURE — 87086 URINE CULTURE/COLONY COUNT: CPT

## 2022-12-24 PROCEDURE — 93005 ELECTROCARDIOGRAM TRACING: CPT

## 2022-12-24 PROCEDURE — U0002 COVID-19 LAB TEST NON-CDC: HCPCS

## 2022-12-24 PROCEDURE — 96375 TX/PRO/DX INJ NEW DRUG ADDON: CPT

## 2022-12-24 PROCEDURE — 93010 ELECTROCARDIOGRAM REPORT: CPT

## 2022-12-24 PROCEDURE — 81003 URINALYSIS AUTO W/O SCOPE: CPT

## 2022-12-24 PROCEDURE — 74177 CT ABD & PELVIS W/CONTRAST: CPT

## 2022-12-24 PROCEDURE — 85025 COMPLETE CBC W/AUTO DIFF WBC: CPT

## 2022-12-24 PROCEDURE — 96365 THER/PROPH/DIAG IV INF INIT: CPT

## 2023-03-02 ENCOUNTER — HOSPITAL ENCOUNTER (EMERGENCY)
Dept: HOSPITAL 92 - ERS | Age: 68
Discharge: ANOTHER HOSPITAL | End: 2023-03-02
Payer: MEDICARE

## 2023-03-02 DIAGNOSIS — I10: ICD-10-CM

## 2023-03-02 DIAGNOSIS — K56.609: Primary | ICD-10-CM

## 2023-03-02 DIAGNOSIS — Z20.822: ICD-10-CM

## 2023-03-02 DIAGNOSIS — I25.10: ICD-10-CM

## 2023-03-02 DIAGNOSIS — G89.18: ICD-10-CM

## 2023-03-02 LAB
ALBUMIN SERPL BCG-MCNC: 4.2 G/DL (ref 3.4–4.8)
ALP SERPL-CCNC: 115 U/L (ref 40–110)
ALT SERPL W P-5'-P-CCNC: 23 U/L (ref 8–55)
ANION GAP SERPL CALC-SCNC: 20 MMOL/L (ref 10–20)
AST SERPL-CCNC: 18 U/L (ref 5–34)
BASOPHILS # BLD AUTO: 0 THOU/UL (ref 0–0.2)
BASOPHILS NFR BLD AUTO: 0.2 % (ref 0–1)
BILIRUB SERPL-MCNC: 0.9 MG/DL (ref 0.2–1.2)
BUN SERPL-MCNC: 20 MG/DL (ref 8.4–25.7)
CALCIUM SERPL-MCNC: 10.3 MG/DL (ref 7.8–10.44)
CHLORIDE SERPL-SCNC: 92 MMOL/L (ref 98–107)
CO2 SERPL-SCNC: 21 MMOL/L (ref 23–31)
CREAT CL PREDICTED SERPL C-G-VRATE: 0 ML/MIN (ref 70–130)
EOSINOPHIL # BLD AUTO: 0.1 THOU/UL (ref 0–0.7)
EOSINOPHIL NFR BLD AUTO: 0.4 % (ref 0–10)
GLOBULIN SER CALC-MCNC: 3.7 G/DL (ref 2.4–3.5)
GLUCOSE SERPL-MCNC: 165 MG/DL (ref 80–115)
HGB BLD-MCNC: 12.5 G/DL (ref 14–18)
LIPASE SERPL-CCNC: 18 U/L (ref 8–78)
LYMPHOCYTES # BLD: 1.3 THOU/UL (ref 1.2–3.4)
LYMPHOCYTES NFR BLD AUTO: 8.9 % (ref 21–51)
MCH RBC QN AUTO: 27.9 PG (ref 27–31)
MCV RBC AUTO: 86.9 FL (ref 78–98)
MONOCYTES # BLD AUTO: 0.7 THOU/UL (ref 0.11–0.59)
MONOCYTES NFR BLD AUTO: 4.9 % (ref 0–10)
NEUTROPHILS # BLD AUTO: 12.5 THOU/UL (ref 1.4–6.5)
NEUTROPHILS NFR BLD AUTO: 85.6 % (ref 42–75)
PLATELET # BLD AUTO: 669 10X3/UL (ref 130–400)
POTASSIUM SERPL-SCNC: 3.6 MMOL/L (ref 3.5–5.1)
RBC # BLD AUTO: 4.47 MILL/UL (ref 4.7–6.1)
SODIUM SERPL-SCNC: 129 MMOL/L (ref 136–145)
WBC # BLD AUTO: 14.6 10X3/UL (ref 4.8–10.8)

## 2023-03-02 PROCEDURE — 74177 CT ABD & PELVIS W/CONTRAST: CPT

## 2023-03-02 PROCEDURE — 99285 EMERGENCY DEPT VISIT HI MDM: CPT

## 2023-03-02 PROCEDURE — 36415 COLL VENOUS BLD VENIPUNCTURE: CPT

## 2023-03-02 PROCEDURE — 83690 ASSAY OF LIPASE: CPT

## 2023-03-02 PROCEDURE — 74018 RADEX ABDOMEN 1 VIEW: CPT

## 2023-03-02 PROCEDURE — 85025 COMPLETE CBC W/AUTO DIFF WBC: CPT

## 2023-03-02 PROCEDURE — 96375 TX/PRO/DX INJ NEW DRUG ADDON: CPT

## 2023-03-02 PROCEDURE — 96376 TX/PRO/DX INJ SAME DRUG ADON: CPT

## 2023-03-02 PROCEDURE — 80053 COMPREHEN METABOLIC PANEL: CPT

## 2023-03-02 PROCEDURE — U0002 COVID-19 LAB TEST NON-CDC: HCPCS

## 2023-03-02 PROCEDURE — 96374 THER/PROPH/DIAG INJ IV PUSH: CPT
